# Patient Record
Sex: FEMALE | HISPANIC OR LATINO | URBAN - METROPOLITAN AREA
[De-identification: names, ages, dates, MRNs, and addresses within clinical notes are randomized per-mention and may not be internally consistent; named-entity substitution may affect disease eponyms.]

---

## 2019-01-24 ENCOUNTER — OFFICE VISIT (OUTPATIENT)
Dept: URBAN - METROPOLITAN AREA CLINIC 48 | Facility: CLINIC | Age: 74
End: 2019-01-24
Payer: MEDICARE

## 2019-01-24 PROCEDURE — 92134 CPTRZ OPH DX IMG PST SGM RTA: CPT | Performed by: OPHTHALMOLOGY

## 2019-01-24 PROCEDURE — 99214 OFFICE O/P EST MOD 30 MIN: CPT | Performed by: OPHTHALMOLOGY

## 2019-01-24 ASSESSMENT — INTRAOCULAR PRESSURE
OS: 16
OD: 16

## 2019-02-19 ENCOUNTER — OFFICE VISIT (OUTPATIENT)
Dept: URBAN - METROPOLITAN AREA CLINIC 48 | Facility: CLINIC | Age: 74
End: 2019-02-19
Payer: MEDICARE

## 2019-02-19 DIAGNOSIS — H35.371 PUCKERING OF MACULA, RIGHT EYE: Primary | ICD-10-CM

## 2019-02-19 DIAGNOSIS — H43.391 OTHER VITREOUS OPACITIES, RIGHT EYE: ICD-10-CM

## 2019-02-19 PROCEDURE — 92012 INTRM OPH EXAM EST PATIENT: CPT | Performed by: OPTOMETRIST

## 2019-02-19 PROCEDURE — 92134 CPTRZ OPH DX IMG PST SGM RTA: CPT | Performed by: OPTOMETRIST

## 2019-02-19 ASSESSMENT — VISUAL ACUITY
OS: 20/50
OD: 20/200

## 2019-02-19 NOTE — IMPRESSION/PLAN
Impression: Other vitreous opacities, right eye: H43.391. OD. Plan: Significant vitreous opacity OD, anterior vitreous. Refer for consult w/Dr. Derrick Gibson. Refraction not altering vision at this time. If no course of action recommended, consider OTC readers.

## 2019-02-19 NOTE — IMPRESSION/PLAN
Impression: Puckering of macula, right eye: H35.371. OD. Plan: Sudden change in vision from prior exam.  Refraction gives no improvement OU. Mac OCT performed today - attached, mild ERM, no significant edema. Refer for consult w/Dr. Jasmyn Whalen, next available.

## 2019-03-14 ENCOUNTER — OFFICE VISIT (OUTPATIENT)
Dept: URBAN - METROPOLITAN AREA CLINIC 48 | Facility: CLINIC | Age: 74
End: 2019-03-14
Payer: MEDICARE

## 2019-03-14 DIAGNOSIS — H47.20 OPTIC ATROPHY: ICD-10-CM

## 2019-03-14 DIAGNOSIS — H26.491 OTHER SECONDARY CATARACT, RIGHT EYE: ICD-10-CM

## 2019-03-14 PROCEDURE — 92014 COMPRE OPH EXAM EST PT 1/>: CPT | Performed by: OPHTHALMOLOGY

## 2019-03-14 PROCEDURE — 92134 CPTRZ OPH DX IMG PST SGM RTA: CPT | Performed by: OPHTHALMOLOGY

## 2019-03-14 ASSESSMENT — INTRAOCULAR PRESSURE
OD: 14
OS: 14

## 2019-03-14 NOTE — IMPRESSION/PLAN
Impression: Other secondary cataract, right eye: H26.491. Plan: Risks, benefits, alternatives, and expectations of YAG capsulotomy were discussed. The patient desires a YAG capsulotomy in the right eye. Schedule YAG capsulotomy in the right eye. For PO1 please do OCT nerve same day(next laser day) per Dr. Witt Estimable Patient may get new prescription glasses 2 months after yag laser.

## 2019-03-14 NOTE — IMPRESSION/PLAN
Impression: Optic atrophy: H47.20.
 atrophy to the nerve and diffuse thinning to the nerve Plan: OCT nerve On same day of PO1 of yag laser. 

Don't suspect [facility] taiwo though history is difficult

## 2019-04-09 ENCOUNTER — SURGERY (OUTPATIENT)
Dept: URBAN - METROPOLITAN AREA SURGERY 26 | Facility: SURGERY | Age: 74
End: 2019-04-09
Payer: MEDICARE

## 2019-04-09 PROCEDURE — 66821 AFTER CATARACT LASER SURGERY: CPT | Performed by: OPHTHALMOLOGY

## 2019-04-16 ENCOUNTER — POST-OPERATIVE VISIT (OUTPATIENT)
Dept: URBAN - METROPOLITAN AREA CLINIC 48 | Facility: CLINIC | Age: 74
End: 2019-04-16
Payer: MEDICARE

## 2019-04-16 DIAGNOSIS — Z09 ENCNTR FOR F/U EXAM AFT TRTMT FOR COND OTH THAN MALIG NEOPLM: Primary | ICD-10-CM

## 2019-04-16 PROCEDURE — 99024 POSTOP FOLLOW-UP VISIT: CPT | Performed by: OPHTHALMOLOGY

## 2019-04-16 ASSESSMENT — INTRAOCULAR PRESSURE: OD: 11

## 2021-10-18 NOTE — IMPRESSION/PLAN
Impression: Puckering of macula, right eye: H35.371. Plan: Mild ERM. No treatment req'd at this time. Will observe for now. Instructed to call clinic urgently if new/worsening metamorphopsia or scotoma. 
F/u in 3-4 mths DFE/OCT or sooner PRN
cuffed